# Patient Record
Sex: FEMALE | Race: WHITE | Employment: STUDENT | URBAN - METROPOLITAN AREA
[De-identification: names, ages, dates, MRNs, and addresses within clinical notes are randomized per-mention and may not be internally consistent; named-entity substitution may affect disease eponyms.]

---
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
## 2024-03-04 DIAGNOSIS — Z30.011 ENCOUNTER FOR INITIAL PRESCRIPTION OF CONTRACEPTIVE PILLS: ICD-10-CM

## 2024-03-04 NOTE — TELEPHONE ENCOUNTER
Refill Request     CONFIRM preferred pharmacy with the patient.    If Mail Order Rx - Pend for 90 day refill.      Last Seen: Last Seen Department: 12/15/2023  Last Seen by PCP: 12/15/2023    Last Written: 9/20/2023, #28, 5 refills    If no future appointment scheduled:  Review the last OV with PCP and review information for follow-up visit,  Route STAFF MESSAGE with patient name to the  Pool for scheduling with the following information:            -  Timing of next visit           -  Visit type ie Physical, OV, etc           -  Diagnoses/Reason ie. COPD, HTN - Do not use MEDICATION, Follow-up or CHECK UP - Give reason for visit      Next Appointment:   Future Appointments   Date Time Provider Department Center   5/10/2024 10:30 AM SCHEDULE, [facility]       Message sent to  to schedule appt with patient?  NO      Requested Prescriptions     Pending Prescriptions Disp Refills    TRI-SPRINTEC 0.18/0.215/0.25 MG-35 MCG TABS [Pharmacy Med Name: TRI-SPRINTEC TABLET] 28 tablet 5     Sig: TAKE 1 TABLET BY MOUTH DAILY

## 2024-03-05 RX ORDER — NORGESTIMATE AND ETHINYL ESTRADIOL 7DAYSX3 28
1 KIT ORAL DAILY
Qty: 28 TABLET | Refills: 5 | Status: SHIPPED | OUTPATIENT
Start: 2024-03-05

## 2024-03-13 ENCOUNTER — OFFICE VISIT (OUTPATIENT)
Dept: FAMILY MEDICINE CLINIC | Age: 19
End: 2024-03-13
Payer: OTHER GOVERNMENT

## 2024-03-13 VITALS
DIASTOLIC BLOOD PRESSURE: 70 MMHG | WEIGHT: 154 LBS | HEART RATE: 114 BPM | HEIGHT: 68 IN | BODY MASS INDEX: 23.34 KG/M2 | OXYGEN SATURATION: 98 % | SYSTOLIC BLOOD PRESSURE: 122 MMHG

## 2024-03-13 DIAGNOSIS — R30.0 DYSURIA: Primary | ICD-10-CM

## 2024-03-13 DIAGNOSIS — R31.9 HEMATURIA, UNSPECIFIED TYPE: ICD-10-CM

## 2024-03-13 DIAGNOSIS — R82.998 LEUKOCYTES IN URINE: ICD-10-CM

## 2024-03-13 DIAGNOSIS — R39.89 SUSPECTED UTI: ICD-10-CM

## 2024-03-13 DIAGNOSIS — Z20.2 POSSIBLE EXPOSURE TO STD: ICD-10-CM

## 2024-03-13 LAB
BILIRUBIN, POC: NEGATIVE
BLOOD URINE, POC: NORMAL
CLARITY, POC: NORMAL
COLOR, POC: YELLOW
GLUCOSE URINE, POC: NEGATIVE
KETONES, POC: NEGATIVE
LEUKOCYTE EST, POC: NORMAL
NITRITE, POC: NEGATIVE
PH, POC: 7
PROTEIN, POC: NORMAL
SPECIFIC GRAVITY, POC: 1.02
UROBILINOGEN, POC: NORMAL

## 2024-03-13 PROCEDURE — 81002 URINALYSIS NONAUTO W/O SCOPE: CPT | Performed by: NURSE PRACTITIONER

## 2024-03-13 PROCEDURE — 99214 OFFICE O/P EST MOD 30 MIN: CPT | Performed by: NURSE PRACTITIONER

## 2024-03-13 RX ORDER — NITROFURANTOIN 25; 75 MG/1; MG/1
100 CAPSULE ORAL 2 TIMES DAILY
Qty: 14 CAPSULE | Refills: 0 | Status: SHIPPED | OUTPATIENT
Start: 2024-03-13 | End: 2024-03-20

## 2024-03-13 ASSESSMENT — ENCOUNTER SYMPTOMS
CONSTIPATION: 1
ABDOMINAL PAIN: 1
NAUSEA: 0
VOMITING: 0
DIARRHEA: 0
BLOOD IN STOOL: 0
SHORTNESS OF BREATH: 0

## 2024-03-13 NOTE — PROGRESS NOTES
Cayla A Enzweiler (:  2005) is a 18 y.o. female,Established patient, here for evaluation of the following chief complaint(s):  Abdominal Pain (Sxs 3/08/2024)         ASSESSMENT/PLAN:  1. Dysuria  -     POCT Urinalysis no Micro  -     Culture, Urine  -     nitrofurantoin, macrocrystal-monohydrate, (MACROBID) 100 MG capsule; Take 1 capsule by mouth 2 times daily for 7 days, Disp-14 capsule, R-0Normal  -     VAGINAL PATHOGENS PROBE *A  -     C.trachomatis N.gonorrhoeae DNA, Urine ([facility] Only); Future  2. Leukocytes in urine  -     POCT Urinalysis no Micro  -     Culture, Urine  -     nitrofurantoin, macrocrystal-monohydrate, (MACROBID) 100 MG capsule; Take 1 capsule by mouth 2 times daily for 7 days, Disp-14 capsule, R-0Normal  -     C.trachomatis N.gonorrhoeae DNA, Urine ([facility] Only); Future  3. Hematuria, unspecified type  -     POCT Urinalysis no Micro  -     Culture, Urine  -     nitrofurantoin, macrocrystal-monohydrate, (MACROBID) 100 MG capsule; Take 1 capsule by mouth 2 times daily for 7 days, Disp-14 capsule, R-0Normal  -     C.trachomatis N.gonorrhoeae DNA, Urine ([facility] Only); Future  4. Possible exposure to STD  -     VAGINAL PATHOGENS PROBE *A  -     C.trachomatis N.gonorrhoeae DNA, Urine ([facility] Only); Future  5. Suspected UTI  -     nitrofurantoin, macrocrystal-monohydrate, (MACROBID) 100 MG capsule; Take 1 capsule by mouth 2 times daily for 7 days, Disp-14 capsule, R-0Normal  -     C.trachomatis N.gonorrhoeae DNA, Urine ([facility] Only); Future    -Urine dip with blood and leukocytes, will send on for culture   -STD counseling provided. Declines HIV, Hep or syphilis at this time but does want to proceed with others.   -If symptoms persist patient will need pelvic exam (declined today), she is agreeable to this if needed . Currently switching GYN providers as previous one was at [facility]. She will establishing with one at [facility]   -Reviewed allergies, discussed

## 2024-03-13 NOTE — PATIENT INSTRUCTIONS
Drink plenty of water   Wipe front to back after urinating  Urinate after sexual intercourse   Avoid bath bombs, bubble baths etc    Change pads/tampons frequently   6. Go to Er or notify office if develop high fever with back pain, significant worsening of symptoms, chest pain or shortness of breath   7. Take antibiotic as prescribed  8. Will call with results of urine culture and base further treatement on results  9. Follow up if symptoms worsen or fail to improve    10. Antibiotics can effect birth control pills, use backup method of protection

## 2024-03-14 LAB
BACTERIA UR CULT: NORMAL
CANDIDA DNA VAG QL NAA+PROBE: NORMAL
G VAGINALIS DNA SPEC QL NAA+PROBE: NORMAL
T VAGINALIS DNA VAG QL NAA+PROBE: NORMAL

## 2024-03-15 LAB
C TRACH DNA UR QL NAA+PROBE: NEGATIVE
N GONORRHOEA DNA UR QL NAA+PROBE: NEGATIVE

## 2024-06-12 ENCOUNTER — OFFICE VISIT (OUTPATIENT)
Dept: FAMILY MEDICINE CLINIC | Age: 19
End: 2024-06-12
Payer: OTHER GOVERNMENT

## 2024-06-12 VITALS
HEART RATE: 81 BPM | HEIGHT: 68 IN | OXYGEN SATURATION: 97 % | WEIGHT: 144 LBS | SYSTOLIC BLOOD PRESSURE: 122 MMHG | DIASTOLIC BLOOD PRESSURE: 60 MMHG | BODY MASS INDEX: 21.82 KG/M2

## 2024-06-12 DIAGNOSIS — N61.0: ICD-10-CM

## 2024-06-12 DIAGNOSIS — N64.4 BREAST PAIN: Primary | ICD-10-CM

## 2024-06-12 DIAGNOSIS — N64.59 NIPPLE PROBLEM: ICD-10-CM

## 2024-06-12 PROCEDURE — 99213 OFFICE O/P EST LOW 20 MIN: CPT | Performed by: NURSE PRACTITIONER

## 2024-06-12 RX ORDER — CLOBETASOL PROPIONATE 0.5 MG/G
OINTMENT TOPICAL
COMMUNITY
Start: 2024-04-22

## 2024-06-12 RX ORDER — AMOXICILLIN AND CLAVULANATE POTASSIUM 875; 125 MG/1; MG/1
1 TABLET, FILM COATED ORAL 2 TIMES DAILY
Qty: 14 TABLET | Refills: 0 | Status: SHIPPED | OUTPATIENT
Start: 2024-06-12 | End: 2024-06-19

## 2024-06-12 ASSESSMENT — ENCOUNTER SYMPTOMS
COUGH: 0
DIARRHEA: 0
STRIDOR: 0
NAUSEA: 0
VOMITING: 0

## 2024-06-12 NOTE — PROGRESS NOTES
Cayla A Enzweiler (:  2005) is a 19 y.o. female,Established patient, here for evaluation of the following chief complaint(s):  Breast Problem (Sxs x6 days. Left side. )      Assessment & Plan   ASSESSMENT/PLAN:  1. Breast pain  -     US BREAST LIMITED LEFT; Future  -     amoxicillin-clavulanate (AUGMENTIN) 875-125 MG per tablet; Take 1 tablet by mouth 2 times daily for 7 days, Disp-14 tablet, R-0Normal  2. Nipple problem  -     US BREAST LIMITED LEFT; Future  -     amoxicillin-clavulanate (AUGMENTIN) 875-125 MG per tablet; Take 1 tablet by mouth 2 times daily for 7 days, Disp-14 tablet, R-0Normal  3. Nonpurulent mastitis    -US pending for further evaluation  -With tenderness to left breast and nipple area and slight swelling noted and size difference in nipples will start Augmentin to treat potential mastitis. Discussed with patient that if symptoms persist and do not improve will send to breast team for further evaluation. She is in agreement with this plan   -Reviewed allergies, discussed medication risks, benefits, side effects. Take medication with food. Advised to use back up method of birth control if taking any   -Reviewed symptom management   -Reviewed alarm symptoms    Patient Instructions   Take antibiotic with food   Cold compresses   Get ultrasound  Notify office if symptoms persist or do not improve or worsen      Return if symptoms worsen or fail to improve.         Subjective   SUBJECTIVE/OBJECTIVE:  Reports symptoms for six days   Left nipple with sharp, stabbing pain, larger in size with redness  Previous cyst on right breast five years ago, was removed   Denies any discharge, fever, N/V, diarrhea, itching   No family history of breast cancer           Breast Problem      Review of Systems   Constitutional:  Negative for chills and fever.   Respiratory:  Negative for cough and stridor.    Gastrointestinal:  Negative for diarrhea, nausea and vomiting.   Genitourinary:         Left breast  Statement Selected

## 2024-06-12 NOTE — PATIENT INSTRUCTIONS
Take antibiotic with food   Cold compresses   Get ultrasound  Notify office if symptoms persist or do not improve or worsen

## 2024-06-20 ENCOUNTER — TELEPHONE (OUTPATIENT)
Dept: FAMILY MEDICINE CLINIC | Age: 19
End: 2024-06-20

## 2024-06-20 NOTE — TELEPHONE ENCOUNTER
Patient called the office stating that she is still having left breast and nipple pain only if she pushes on her breast. She continues to have constant discharge, this is increasing daily. The pus is green/yellow, not sure about an odor. The pus is only on the nipple area. Breast is still red and warm to the touch. Patient has finished her antibiotic as of today.     Patient couldn't get in for her breast US until July 3rd.     Please advise, thanks.

## 2024-06-20 NOTE — TELEPHONE ENCOUNTER
Please have patient come in for a visit and wound culture. Would like to re-evaluate breast tissue and likely start alternative treatment. OK to schedule with me or female provider

## 2024-06-21 ENCOUNTER — OFFICE VISIT (OUTPATIENT)
Dept: FAMILY MEDICINE CLINIC | Age: 19
End: 2024-06-21
Payer: OTHER GOVERNMENT

## 2024-06-21 VITALS
DIASTOLIC BLOOD PRESSURE: 60 MMHG | WEIGHT: 142.4 LBS | OXYGEN SATURATION: 100 % | SYSTOLIC BLOOD PRESSURE: 120 MMHG | HEART RATE: 76 BPM | BODY MASS INDEX: 21.65 KG/M2

## 2024-06-21 DIAGNOSIS — N61.0: Primary | ICD-10-CM

## 2024-06-21 PROCEDURE — 99212 OFFICE O/P EST SF 10 MIN: CPT | Performed by: STUDENT IN AN ORGANIZED HEALTH CARE EDUCATION/TRAINING PROGRAM

## 2024-06-21 ASSESSMENT — PATIENT HEALTH QUESTIONNAIRE - PHQ9
SUM OF ALL RESPONSES TO PHQ QUESTIONS 1-9: 0
1. LITTLE INTEREST OR PLEASURE IN DOING THINGS: NOT AT ALL
SUM OF ALL RESPONSES TO PHQ QUESTIONS 1-9: 0
2. FEELING DOWN, DEPRESSED OR HOPELESS: NOT AT ALL
SUM OF ALL RESPONSES TO PHQ9 QUESTIONS 1 & 2: 0

## 2024-06-21 NOTE — PROGRESS NOTES
Assessment:  Encounter Diagnosis   Name Primary?    Nonpurulent mastitis Yes       Plan:  1. Nonpurulent mastitis  Exam today reassuring with no expressible discharge. No tenderness on exam. If recurrent then return for evaluation but discussed likely pus was from abscess appropriate treatment on abx.       No follow-ups on file.      Patient: Cayla A Enzweiler is a 19 y.o. female who presents today with the following Chief Complaint(s):  Chief Complaint   Patient presents with    Breast Pain      Had an antibiotic got it (6/12) , - finished it (6/20) L breast, tender, puss coming out of nipple 2 days before finishing the antibiotic   breast re-evaluation           HPI    Reports abscess behind areola on left breast 6.12  Started on augmentin and reports that pain and tenderness improved but then noticed pus drainage from nipple area over last 2 days. Today drainage area has scabbed with no discharge and no pain    Does have history of right breast cyst in 8th grade that was removed.   Current Outpatient Medications   Medication Sig Dispense Refill    clobetasol (TEMOVATE) 0.05 % ointment       TRI-SPRINTEC 0.18/0.215/0.25 MG-35 MCG TABS TAKE 1 TABLET BY MOUTH DAILY 28 tablet 5     No current facility-administered medications for this visit.       Patient's past medical history, surgical history, family history, medications,  andallergies  were all reviewed and updated as appropriate today.      Review of Systems  All other systems reviewed and negative    Physical Exam  Exam conducted with a chaperone present (John VILLANUEVA).   Chest:   Breasts:     Left: No inverted nipple, mass, nipple discharge, skin change or tenderness.       Vitals:    06/21/24 1036   BP: 120/60   Pulse: 76   SpO2: 100%       Please note that portions of this note may have been completed with a voice recognition program. Efforts were made to edit the dictations but occasionally words are mis-transcribed.

## 2024-06-21 NOTE — TELEPHONE ENCOUNTER
Spoke with patient, she stated that it has gotten better since the phone call. She stated that personally for what it was will come back in for re evaluation

## 2024-07-11 ENCOUNTER — HOSPITAL ENCOUNTER (OUTPATIENT)
Dept: GENERAL RADIOLOGY | Age: 19
Discharge: HOME | End: 2024-07-11
Payer: OTHER GOVERNMENT

## 2024-07-11 ENCOUNTER — OFFICE VISIT (OUTPATIENT)
Dept: FAMILY MEDICINE CLINIC | Age: 19
End: 2024-07-11
Payer: OTHER GOVERNMENT

## 2024-07-11 VITALS
SYSTOLIC BLOOD PRESSURE: 114 MMHG | DIASTOLIC BLOOD PRESSURE: 70 MMHG | HEART RATE: 62 BPM | WEIGHT: 140.8 LBS | HEIGHT: 68 IN | OXYGEN SATURATION: 98 % | BODY MASS INDEX: 21.34 KG/M2

## 2024-07-11 DIAGNOSIS — K59.00 CONSTIPATION, UNSPECIFIED CONSTIPATION TYPE: Primary | ICD-10-CM

## 2024-07-11 DIAGNOSIS — K59.04 CHRONIC IDIOPATHIC CONSTIPATION: Primary | ICD-10-CM

## 2024-07-11 DIAGNOSIS — R10.9 ABDOMINAL PAIN, UNSPECIFIED ABDOMINAL LOCATION: ICD-10-CM

## 2024-07-11 DIAGNOSIS — K59.00 CONSTIPATION, UNSPECIFIED CONSTIPATION TYPE: ICD-10-CM

## 2024-07-11 DIAGNOSIS — R11.0 NAUSEA: ICD-10-CM

## 2024-07-11 DIAGNOSIS — R10.84 GENERALIZED ABDOMINAL PAIN: ICD-10-CM

## 2024-07-11 PROCEDURE — 99213 OFFICE O/P EST LOW 20 MIN: CPT | Performed by: STUDENT IN AN ORGANIZED HEALTH CARE EDUCATION/TRAINING PROGRAM

## 2024-07-11 PROCEDURE — 74018 RADEX ABDOMEN 1 VIEW: CPT

## 2024-07-11 NOTE — PROGRESS NOTES
Assessment:  Encounter Diagnoses   Name Primary?    Chronic idiopathic constipation Yes    Generalized abdominal pain        Plan:  1. Chronic idiopathic constipation  2. Generalized abdominal pain  KUB obtained prior to visit and interpreted by me with large amount of stool present and no obvious bowel obstruction. Generalized mild/moderate abdominal tenderness on exam but most significant in RLQ and LLQ. Reports passing gas and low suspicion for impaction. Discussed aggressive cleanout vs increasing golax to 1 cap BID until passage of stool. Will go with less aggressive treatment to start. Will then start daily fiber supplement and try to avoid use of stimulant laxatives routinely. If bowel frequency and pain does not improve with use of metamucil/citrucil supplementation then consider GI evaluation.       No follow-ups on file.      Patient: Cayla A Enzweiler is a 19 y.o. female who presents today with the following Chief Complaint(s):  Chief Complaint   Patient presents with    Abdominal Pain         HPI    Has a history of chronic constipation  Has required bowel cleanout intermittently  Reports she has not previously seen GI  No Familial history of GI abnormality    Reports that she will frequently go 2-5 days without BM but has noticed increasing abdominal pain and radiation around to her back over the last few days since her previous BM. Will typically use golax (doccusate) capsules with improvement. Does not add fiber to her diet but does drink 64 oz of fluid+ daily.     Current Outpatient Medications   Medication Sig Dispense Refill    clobetasol (TEMOVATE) 0.05 % ointment       TRI-SPRINTEC 0.18/0.215/0.25 MG-35 MCG TABS TAKE 1 TABLET BY MOUTH DAILY 28 tablet 5     No current facility-administered medications for this visit.       Patient's past medical history, surgical history, family history, medications,  andallergies  were all reviewed and updated as appropriate today.      Review of Systems  All

## 2024-08-14 ENCOUNTER — OFFICE VISIT (OUTPATIENT)
Dept: FAMILY MEDICINE CLINIC | Age: 19
End: 2024-08-14
Payer: OTHER GOVERNMENT

## 2024-08-14 VITALS
DIASTOLIC BLOOD PRESSURE: 50 MMHG | TEMPERATURE: 97 F | WEIGHT: 144 LBS | HEART RATE: 67 BPM | SYSTOLIC BLOOD PRESSURE: 90 MMHG | HEIGHT: 68 IN | BODY MASS INDEX: 21.82 KG/M2 | OXYGEN SATURATION: 98 %

## 2024-08-14 DIAGNOSIS — R00.0 TACHYCARDIA: ICD-10-CM

## 2024-08-14 DIAGNOSIS — R42 DIZZINESS: Primary | ICD-10-CM

## 2024-08-14 PROCEDURE — 99213 OFFICE O/P EST LOW 20 MIN: CPT | Performed by: STUDENT IN AN ORGANIZED HEALTH CARE EDUCATION/TRAINING PROGRAM

## 2024-08-14 SDOH — ECONOMIC STABILITY: FOOD INSECURITY: WITHIN THE PAST 12 MONTHS, THE FOOD YOU BOUGHT JUST DIDN'T LAST AND YOU DIDN'T HAVE MONEY TO GET MORE.: NEVER TRUE

## 2024-08-14 SDOH — ECONOMIC STABILITY: INCOME INSECURITY: HOW HARD IS IT FOR YOU TO PAY FOR THE VERY BASICS LIKE FOOD, HOUSING, MEDICAL CARE, AND HEATING?: NOT HARD AT ALL

## 2024-08-14 SDOH — ECONOMIC STABILITY: FOOD INSECURITY: WITHIN THE PAST 12 MONTHS, YOU WORRIED THAT YOUR FOOD WOULD RUN OUT BEFORE YOU GOT MONEY TO BUY MORE.: NEVER TRUE

## 2024-08-14 NOTE — PROGRESS NOTES
Assessment:  Encounter Diagnoses   Name Primary?    Dizziness Yes    Tachycardia        Plan:  1. Dizziness  -     Tilt table; Future  2. Tachycardia  -     Tilt table; Future  Question possible POTS with this being the second episode of tachycardia and lightheadedness.  Will have patient add an electrolyte beverage in the morning such as Gatorade in addition to her current fluid intake.  Also encouraged to continue exercise.  Will check tilt table test.  Reviewed lab work from [facility] including EKG which were all reassuring.  Consider cardiac evaluation and Holter monitor if recurrent    No follow-ups on file.      Patient: Cayla A Enzweiler is a 19 y.o. 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  She then sat down and started to feel a little better  These symptoms lasted about 30 minutes  Reports that during this episode her HR max was 151.     Reports that (8/10) after she felt a sharp midsternal chest pain that then spread into the rest of chest and into stomach. This lasted about 45 minutes. Was then taken to [facility] and blood work normal.      This is the second time this episode has opened    Trying to drink about 40 oz of feliciano cup, + 20 oz at work. Then also having a juice in the morning as well as gentlelax.     Typically wears apple watch daily. Has not noticed any other tachycardia episodes.   Stays active played basketball, soccer, running on her own without difficulties.     Current Outpatient Medications   Medication Sig Dispense Refill    clobetasol (TEMOVATE) 0.05 % ointment       TRI-SPRINTEC 0.18/0.215/0.25 MG-35 MCG TABS TAKE 1 TABLET BY MOUTH DAILY 28

## 2024-08-19 DIAGNOSIS — Z30.011 ENCOUNTER FOR INITIAL PRESCRIPTION OF CONTRACEPTIVE PILLS: ICD-10-CM

## 2024-08-19 RX ORDER — NORGESTIMATE AND ETHINYL ESTRADIOL 7DAYSX3 28
1 KIT ORAL DAILY
Qty: 84 TABLET | Refills: 3 | Status: SHIPPED | OUTPATIENT
Start: 2024-08-19

## 2024-08-19 NOTE — TELEPHONE ENCOUNTER
Refill Request     CONFIRM preferred pharmacy with the patient.    If Mail Order Rx - Pend for 90 day refill.      Last Seen: Last Seen Department: 8/14/2024  Last Seen by PCP: 8/14/2024    Last Written: 3/5/24 #28 - 5 refills     If no future appointment scheduled:  Review the last OV with PCP and review information for follow-up visit,  Route STAFF MESSAGE with patient name to the  Pool for scheduling with the following information:            -  Timing of next visit           -  Visit type ie Physical, OV, etc           -  Diagnoses/Reason ie. COPD, HTN - Do not use MEDICATION, Follow-up or CHECK UP - Give reason for visit      Next Appointment:   No future appointments.    Message sent to  to schedule appt with patient?  NO      Requested Prescriptions     Pending Prescriptions Disp Refills    Norgestim-Eth Estrad Triphasic (TRI-SPRINTEC) 0.18/0.215/0.25 MG-35 MCG TABS 28 tablet 5     Sig: Take 1 tablet by mouth daily

## 2024-09-24 ENCOUNTER — APPOINTMENT (OUTPATIENT)
Dept: GENERAL RADIOLOGY | Age: 19
End: 2024-09-24
Payer: OTHER GOVERNMENT

## 2024-09-24 ENCOUNTER — OFFICE VISIT (OUTPATIENT)
Dept: FAMILY MEDICINE CLINIC | Age: 19
End: 2024-09-24
Payer: OTHER GOVERNMENT

## 2024-09-24 ENCOUNTER — HOSPITAL ENCOUNTER (EMERGENCY)
Age: 19
Discharge: HOME | End: 2024-09-24
Payer: OTHER GOVERNMENT

## 2024-09-24 VITALS
TEMPERATURE: 98.3 F | SYSTOLIC BLOOD PRESSURE: 112 MMHG | OXYGEN SATURATION: 99 % | HEART RATE: 82 BPM | WEIGHT: 132 LBS | RESPIRATION RATE: 19 BRPM | DIASTOLIC BLOOD PRESSURE: 72 MMHG | HEIGHT: 68 IN | BODY MASS INDEX: 20 KG/M2

## 2024-09-24 VITALS
DIASTOLIC BLOOD PRESSURE: 70 MMHG | OXYGEN SATURATION: 98 % | BODY MASS INDEX: 20.16 KG/M2 | WEIGHT: 133 LBS | HEIGHT: 68 IN | HEART RATE: 86 BPM | SYSTOLIC BLOOD PRESSURE: 112 MMHG

## 2024-09-24 DIAGNOSIS — R05.1 ACUTE COUGH: Primary | ICD-10-CM

## 2024-09-24 DIAGNOSIS — J06.9 VIRAL URI: ICD-10-CM

## 2024-09-24 DIAGNOSIS — T78.40XA ALLERGIC REACTION, INITIAL ENCOUNTER: ICD-10-CM

## 2024-09-24 PROCEDURE — 96375 TX/PRO/DX INJ NEW DRUG ADDON: CPT

## 2024-09-24 PROCEDURE — 99213 OFFICE O/P EST LOW 20 MIN: CPT | Performed by: STUDENT IN AN ORGANIZED HEALTH CARE EDUCATION/TRAINING PROGRAM

## 2024-09-24 PROCEDURE — 2580000003 HC RX 258: Performed by: REGISTERED NURSE

## 2024-09-24 PROCEDURE — 2500000003 HC RX 250 WO HCPCS: Performed by: REGISTERED NURSE

## 2024-09-24 PROCEDURE — 99284 EMERGENCY DEPT VISIT MOD MDM: CPT

## 2024-09-24 PROCEDURE — 71046 X-RAY EXAM CHEST 2 VIEWS: CPT

## 2024-09-24 PROCEDURE — 96374 THER/PROPH/DIAG INJ IV PUSH: CPT

## 2024-09-24 PROCEDURE — 6360000002 HC RX W HCPCS: Performed by: REGISTERED NURSE

## 2024-09-24 RX ORDER — AZITHROMYCIN 250 MG/1
TABLET, FILM COATED ORAL
Qty: 6 TABLET | Refills: 0 | Status: SHIPPED | OUTPATIENT
Start: 2024-09-24 | End: 2024-09-24

## 2024-09-24 RX ORDER — AZITHROMYCIN 250 MG/1
TABLET, FILM COATED ORAL
Qty: 6 TABLET | Refills: 0 | Status: SHIPPED | OUTPATIENT
Start: 2024-09-24 | End: 2024-10-04

## 2024-09-24 RX ORDER — ONDANSETRON 2 MG/ML
4 INJECTION INTRAMUSCULAR; INTRAVENOUS ONCE
Status: COMPLETED | OUTPATIENT
Start: 2024-09-24 | End: 2024-09-24

## 2024-09-24 RX ORDER — BENZONATATE 200 MG/1
200 CAPSULE ORAL 3 TIMES DAILY PRN
Qty: 30 CAPSULE | Refills: 0 | Status: SHIPPED | OUTPATIENT
Start: 2024-09-24 | End: 2024-10-01

## 2024-09-24 RX ADMIN — ONDANSETRON 4 MG: 2 INJECTION INTRAMUSCULAR; INTRAVENOUS at 18:21

## 2024-09-24 RX ADMIN — FAMOTIDINE 20 MG: 10 INJECTION, SOLUTION INTRAVENOUS at 18:21

## 2024-09-24 ASSESSMENT — PAIN SCALES - GENERAL: PAINLEVEL_OUTOF10: 6

## 2024-09-24 ASSESSMENT — PAIN DESCRIPTION - DESCRIPTORS: DESCRIPTORS: ACHING

## 2024-09-24 ASSESSMENT — PAIN DESCRIPTION - LOCATION: LOCATION: BACK

## 2024-09-24 ASSESSMENT — PAIN DESCRIPTION - ORIENTATION: ORIENTATION: UPPER

## 2024-09-24 ASSESSMENT — PAIN - FUNCTIONAL ASSESSMENT: PAIN_FUNCTIONAL_ASSESSMENT: 0-10

## 2024-09-25 ENCOUNTER — TELEPHONE (OUTPATIENT)
Dept: FAMILY MEDICINE CLINIC | Age: 19
End: 2024-09-25

## 2024-09-25 ASSESSMENT — ENCOUNTER SYMPTOMS
CHEST TIGHTNESS: 0
SHORTNESS OF BREATH: 1
WHEEZING: 0
ABDOMINAL PAIN: 0
STRIDOR: 0
COUGH: 1

## 2025-03-06 ENCOUNTER — TELEPHONE (OUTPATIENT)
Dept: FAMILY MEDICINE CLINIC | Age: 20
End: 2025-03-06

## 2025-03-06 NOTE — TELEPHONE ENCOUNTER
Patient called into the office requesting an appointment due to jaw pain.  Patient states that her jaw pain has been going on for a few weeks. Patient scheduled for 03/10/25.  Patient advised to arrive 15 minutes early for vitals and check in.  Patient verbalized understanding.

## 2025-03-10 ENCOUNTER — OFFICE VISIT (OUTPATIENT)
Dept: FAMILY MEDICINE CLINIC | Age: 20
End: 2025-03-10
Payer: OTHER GOVERNMENT

## 2025-03-10 VITALS
HEIGHT: 68 IN | BODY MASS INDEX: 21.22 KG/M2 | DIASTOLIC BLOOD PRESSURE: 70 MMHG | HEART RATE: 78 BPM | WEIGHT: 140 LBS | OXYGEN SATURATION: 98 % | SYSTOLIC BLOOD PRESSURE: 116 MMHG

## 2025-03-10 DIAGNOSIS — M26.609 TMJ DYSFUNCTION: Primary | ICD-10-CM

## 2025-03-10 PROCEDURE — 99213 OFFICE O/P EST LOW 20 MIN: CPT | Performed by: STUDENT IN AN ORGANIZED HEALTH CARE EDUCATION/TRAINING PROGRAM

## 2025-03-10 SDOH — ECONOMIC STABILITY: FOOD INSECURITY: WITHIN THE PAST 12 MONTHS, THE FOOD YOU BOUGHT JUST DIDN'T LAST AND YOU DIDN'T HAVE MONEY TO GET MORE.: NEVER TRUE

## 2025-03-10 SDOH — ECONOMIC STABILITY: FOOD INSECURITY: WITHIN THE PAST 12 MONTHS, YOU WORRIED THAT YOUR FOOD WOULD RUN OUT BEFORE YOU GOT MONEY TO BUY MORE.: NEVER TRUE

## 2025-03-10 ASSESSMENT — PATIENT HEALTH QUESTIONNAIRE - PHQ9
SUM OF ALL RESPONSES TO PHQ QUESTIONS 1-9: 0
1. LITTLE INTEREST OR PLEASURE IN DOING THINGS: NOT AT ALL
2. FEELING DOWN, DEPRESSED OR HOPELESS: NOT AT ALL
SUM OF ALL RESPONSES TO PHQ QUESTIONS 1-9: 0

## 2025-03-10 NOTE — PROGRESS NOTES
Assessment:  Encounter Diagnosis   Name Primary?    TMJ dysfunction Yes       Plan:  1. TMJ dysfunction  Exam with masseter tenderness, jaw deviation consistent with muscular spasm.  Given home exercises and will follow-up if not improving      No follow-ups on file.      Patient: Cayla A Enzweiler is a 19 y.o. female who presents today with the following Chief Complaint(s):  Chief Complaint   Patient presents with    Jaw Pain         HPI    Reports that during recent episode o emesis she felt pop on right side  Will intermittently improve and have no issues then will wake up at other times with significant pain and difficulty opening mouth completely.   Will occur 1-2 times aweek and last for a day or two.       Current Outpatient Medications   Medication Sig Dispense Refill    Norgestim-Eth Estrad Triphasic (TRI-SPRINTEC) 0.18/0.215/0.25 MG-35 MCG TABS Take 1 tablet by mouth daily 84 tablet 3    clobetasol (TEMOVATE) 0.05 % ointment        No current facility-administered medications for this visit.       Patient's past medical history, surgical history, family history, medications,  andallergies  were all reviewed and updated as appropriate today.      Review of Systems  All other systems reviewed and negative    Physical Exam  HENT:      Head:        Comments: Tenderness inferior to TMJ joint along body of right masseter      Pain with jaw deviation away from affected side  Jaw deviation to the right with attempted max opening  Vitals:    03/10/25 1353   BP: 116/70   Pulse: 78   SpO2: 98%       Please note that portions of this note may have been completed with a voice recognition program. Efforts were made to edit the dictations but occasionally words are mis-transcribed.

## 2025-05-09 ENCOUNTER — APPOINTMENT (OUTPATIENT)
Dept: CT IMAGING | Age: 20
End: 2025-05-09
Payer: OTHER GOVERNMENT

## 2025-05-09 ENCOUNTER — HOSPITAL ENCOUNTER (EMERGENCY)
Age: 20
Discharge: HOME | End: 2025-05-09
Attending: STUDENT IN AN ORGANIZED HEALTH CARE EDUCATION/TRAINING PROGRAM
Payer: OTHER GOVERNMENT

## 2025-05-09 VITALS
OXYGEN SATURATION: 100 % | WEIGHT: 136.8 LBS | SYSTOLIC BLOOD PRESSURE: 114 MMHG | RESPIRATION RATE: 16 BRPM | BODY MASS INDEX: 20.73 KG/M2 | HEIGHT: 68 IN | DIASTOLIC BLOOD PRESSURE: 70 MMHG | HEART RATE: 57 BPM | TEMPERATURE: 97.6 F

## 2025-05-09 DIAGNOSIS — K59.00 CONSTIPATION, UNSPECIFIED CONSTIPATION TYPE: Primary | ICD-10-CM

## 2025-05-09 LAB
ALBUMIN SERPL-MCNC: 4.5 G/DL (ref 3.4–5)
ALBUMIN/GLOB SERPL: 1.7 {RATIO} (ref 1.1–2.2)
ALP SERPL-CCNC: 45 U/L (ref 40–129)
ALT SERPL-CCNC: 16 U/L (ref 10–40)
ANION GAP SERPL CALCULATED.3IONS-SCNC: 12 MMOL/L (ref 3–16)
AST SERPL-CCNC: 17 U/L (ref 15–37)
BASOPHILS # BLD: 0 K/UL (ref 0–0.2)
BASOPHILS NFR BLD: 0.7 %
BILIRUB SERPL-MCNC: 0.5 MG/DL (ref 0–1)
BILIRUB UR QL STRIP.AUTO: NEGATIVE
BUN SERPL-MCNC: 8 MG/DL (ref 7–20)
CALCIUM SERPL-MCNC: 10.4 MG/DL (ref 8.3–10.6)
CHLORIDE SERPL-SCNC: 103 MMOL/L (ref 99–110)
CLARITY UR: CLEAR
CO2 SERPL-SCNC: 25 MMOL/L (ref 21–32)
COLOR UR: YELLOW
CREAT SERPL-MCNC: 0.8 MG/DL (ref 0.6–1.1)
DEPRECATED RDW RBC AUTO: 12.4 % (ref 12.4–15.4)
EOSINOPHIL # BLD: 0.1 K/UL (ref 0–0.6)
EOSINOPHIL NFR BLD: 2.1 %
GFR SERPLBLD CREATININE-BSD FMLA CKD-EPI: >90 ML/MIN/{1.73_M2}
GLUCOSE SERPL-MCNC: 85 MG/DL (ref 70–99)
GLUCOSE UR STRIP.AUTO-MCNC: NEGATIVE MG/DL
HCG SERPL QL: NEGATIVE
HCT VFR BLD AUTO: 42.4 % (ref 36–48)
HGB BLD-MCNC: 14.1 G/DL (ref 12–16)
HGB UR QL STRIP.AUTO: NEGATIVE
KETONES UR STRIP.AUTO-MCNC: NEGATIVE MG/DL
LEUKOCYTE ESTERASE UR QL STRIP.AUTO: NEGATIVE
LIPASE SERPL-CCNC: 40 U/L (ref 13–60)
LYMPHOCYTES # BLD: 1.2 K/UL (ref 1–5.1)
LYMPHOCYTES NFR BLD: 40.6 %
MCH RBC QN AUTO: 29.4 PG (ref 26–34)
MCHC RBC AUTO-ENTMCNC: 33.2 G/DL (ref 31–36)
MCV RBC AUTO: 88.6 FL (ref 80–100)
MONOCYTES # BLD: 0.3 K/UL (ref 0–1.3)
MONOCYTES NFR BLD: 9.5 %
NEUTROPHILS # BLD: 1.4 K/UL (ref 1.7–7.7)
NEUTROPHILS NFR BLD: 47.1 %
NITRITE UR QL STRIP.AUTO: NEGATIVE
PH UR STRIP.AUTO: 8 [PH] (ref 5–8)
PLATELET # BLD AUTO: 264 K/UL (ref 135–450)
PMV BLD AUTO: 8.7 FL (ref 5–10.5)
POTASSIUM SERPL-SCNC: 4.1 MMOL/L (ref 3.5–5.1)
PROT SERPL-MCNC: 7.1 G/DL (ref 6.4–8.2)
PROT UR STRIP.AUTO-MCNC: NEGATIVE MG/DL
RBC # BLD AUTO: 4.78 M/UL (ref 4–5.2)
SODIUM SERPL-SCNC: 140 MMOL/L (ref 136–145)
SP GR UR STRIP.AUTO: 1.02 (ref 1–1.03)
UA COMPLETE W REFLEX CULTURE PNL UR: NORMAL
UA DIPSTICK W REFLEX MICRO PNL UR: NORMAL
URN SPEC COLLECT METH UR: NORMAL
UROBILINOGEN UR STRIP-ACNC: 0.2 E.U./DL
WBC # BLD AUTO: 2.9 K/UL (ref 4–11)

## 2025-05-09 PROCEDURE — 81003 URINALYSIS AUTO W/O SCOPE: CPT

## 2025-05-09 PROCEDURE — 80053 COMPREHEN METABOLIC PANEL: CPT

## 2025-05-09 PROCEDURE — 84703 CHORIONIC GONADOTROPIN ASSAY: CPT

## 2025-05-09 PROCEDURE — 85025 COMPLETE CBC W/AUTO DIFF WBC: CPT

## 2025-05-09 PROCEDURE — 99285 EMERGENCY DEPT VISIT HI MDM: CPT

## 2025-05-09 PROCEDURE — 74177 CT ABD & PELVIS W/CONTRAST: CPT

## 2025-05-09 PROCEDURE — 83690 ASSAY OF LIPASE: CPT

## 2025-05-09 PROCEDURE — 6360000004 HC RX CONTRAST MEDICATION

## 2025-05-09 RX ORDER — LACTULOSE 10 G/15ML
20 SOLUTION ORAL EVERY EVENING
Qty: 900 ML | Refills: 0 | Status: SHIPPED | OUTPATIENT
Start: 2025-05-09

## 2025-05-09 RX ORDER — IOPAMIDOL 755 MG/ML
75 INJECTION, SOLUTION INTRAVASCULAR
Status: COMPLETED | OUTPATIENT
Start: 2025-05-09 | End: 2025-05-09

## 2025-05-09 RX ADMIN — IOPAMIDOL 75 ML: 755 INJECTION, SOLUTION INTRAVENOUS at 13:16

## 2025-05-09 ASSESSMENT — PAIN SCALES - GENERAL: PAINLEVEL_OUTOF10: 5

## 2025-05-09 ASSESSMENT — PAIN DESCRIPTION - LOCATION: LOCATION: ABDOMEN

## 2025-05-09 ASSESSMENT — PAIN - FUNCTIONAL ASSESSMENT: PAIN_FUNCTIONAL_ASSESSMENT: 0-10

## 2025-05-09 NOTE — ED PROVIDER NOTES
[facility] EMERGENCY DEPARTMENT  EMERGENCY DEPARTMENT ENCOUNTER        Pt Name: Cayla A Enzweiler  MRN: [id number]  Birthdate 2005  Date of evaluation: 5/9/2025  Provider: SUSAN Villegas  PCP: Victorino Denis DO  Note Started: 12:18 PM EDT 5/9/25       I have seen and evaluated this patient with my supervising physician Dr. Deng       CHIEF COMPLAINT       Chief Complaint   Patient presents with    Constipation     No BM in 6 days. Pt c/o sharp pains and cramping and nausea. Pt has tried multiple OTC medications.        HISTORY OF PRESENT ILLNESS: 1 or more Elements     History From: Patient    Limitations to history : None    Social Determinants Significantly Affecting Health : None    Chief Complaint: Constipation    Cayla A Enzweiler is a 19 y.o. female who presents to the emergency department for constipation.  States that she has not had a good bowel movement in the last 6 days.  She does complain of a history of constipation however she states only when she takes MiraLAX or other laxatives that she is able to have a good bowel movement.  She states there has been small amounts of liquid stool.  Describes the pain as being sharp and cramping.  Admits associated nausea, no vomiting.  Denies fevers, chills, chest pain, shortness of breath, dysuria, urinary frequency or urgency.    Nursing Notes were all reviewed and agreed with or any disagreements were addressed in the HPI.    REVIEW OF SYSTEMS :      Review of Systems    Positives and Pertinent negatives as per HPI.     SURGICAL HISTORY     Past Surgical History:   Procedure Laterality Date    CYST REMOVAL Right        CURRENTMEDICATIONS       Discharge Medication List as of 5/9/2025  3:49 PM        CONTINUE these medications which have NOT CHANGED    Details   Norgestim-Eth Estrad Triphasic (TRI-SPRINTEC) 0.18/0.215/0.25 MG-35 MCG TABS Take 1 tablet by mouth daily, Disp-84 tablet, R-3Normal      clobetasol (TEMOVATE) 0.05 % ointment

## 2025-05-11 ENCOUNTER — HOSPITAL ENCOUNTER (EMERGENCY)
Age: 20
Discharge: HOME | End: 2025-05-11
Attending: EMERGENCY MEDICINE
Payer: OTHER GOVERNMENT

## 2025-05-11 VITALS
WEIGHT: 135.4 LBS | OXYGEN SATURATION: 100 % | HEART RATE: 69 BPM | BODY MASS INDEX: 20.52 KG/M2 | HEIGHT: 68 IN | SYSTOLIC BLOOD PRESSURE: 121 MMHG | TEMPERATURE: 98.1 F | DIASTOLIC BLOOD PRESSURE: 88 MMHG | RESPIRATION RATE: 14 BRPM

## 2025-05-11 DIAGNOSIS — K59.00 CONSTIPATION, UNSPECIFIED CONSTIPATION TYPE: Primary | ICD-10-CM

## 2025-05-11 PROCEDURE — 99283 EMERGENCY DEPT VISIT LOW MDM: CPT

## 2025-05-11 PROCEDURE — 6370000000 HC RX 637 (ALT 250 FOR IP): Performed by: EMERGENCY MEDICINE

## 2025-05-11 RX ORDER — SUCRALFATE 1 G/1
1 TABLET ORAL EVERY 6 HOURS
Qty: 80 TABLET | Refills: 3 | Status: SHIPPED | OUTPATIENT
Start: 2025-05-11

## 2025-05-11 RX ORDER — DICYCLOMINE HCL 20 MG
20 TABLET ORAL EVERY 6 HOURS
Qty: 80 TABLET | Refills: 0 | Status: SHIPPED | OUTPATIENT
Start: 2025-05-11

## 2025-05-11 RX ORDER — FAMOTIDINE 20 MG/1
20 TABLET, FILM COATED ORAL 2 TIMES DAILY PRN
Qty: 60 TABLET | Refills: 0 | Status: SHIPPED | OUTPATIENT
Start: 2025-05-11

## 2025-05-11 RX ORDER — DICYCLOMINE HCL 20 MG
20 TABLET ORAL ONCE
Status: COMPLETED | OUTPATIENT
Start: 2025-05-11 | End: 2025-05-11

## 2025-05-11 RX ORDER — FAMOTIDINE 20 MG/1
20 TABLET, FILM COATED ORAL ONCE
Status: COMPLETED | OUTPATIENT
Start: 2025-05-11 | End: 2025-05-11

## 2025-05-11 RX ORDER — SUCRALFATE 1 G/1
1 TABLET ORAL ONCE
Status: COMPLETED | OUTPATIENT
Start: 2025-05-11 | End: 2025-05-11

## 2025-05-11 RX ADMIN — FAMOTIDINE 20 MG: 20 TABLET, FILM COATED ORAL at 13:38

## 2025-05-11 RX ADMIN — SUCRALFATE 1 G: 1 TABLET ORAL at 13:37

## 2025-05-11 RX ADMIN — DICYCLOMINE HYDROCHLORIDE 20 MG: 20 TABLET ORAL at 13:37

## 2025-05-11 ASSESSMENT — LIFESTYLE VARIABLES
HOW MANY STANDARD DRINKS CONTAINING ALCOHOL DO YOU HAVE ON A TYPICAL DAY: PATIENT DOES NOT DRINK
HOW OFTEN DO YOU HAVE A DRINK CONTAINING ALCOHOL: NEVER

## 2025-05-11 ASSESSMENT — PAIN - FUNCTIONAL ASSESSMENT: PAIN_FUNCTIONAL_ASSESSMENT: 0-10

## 2025-05-11 ASSESSMENT — PAIN SCALES - GENERAL: PAINLEVEL_OUTOF10: 5

## 2025-05-11 ASSESSMENT — PAIN DESCRIPTION - LOCATION: LOCATION: ABDOMEN;BACK

## 2025-05-11 ASSESSMENT — PAIN DESCRIPTION - PAIN TYPE: TYPE: ACUTE PAIN

## 2025-05-11 ASSESSMENT — PAIN DESCRIPTION - DESCRIPTORS: DESCRIPTORS: CRAMPING;SHARP

## 2025-05-11 NOTE — ED PROVIDER NOTES
bowel movement.  Patient and father are agreeable to this plan until they are able to see gastroenterology.  Patient given a dose of Pepcid, Bentyl and Carafate in the ED and provided with prescriptions.  Patient is stable for discharge.    After discussion of results, diagnosis, and symptomatic care, I reiterated return precautions and importance of follow-up.   She Patient expressed understanding of all instruction. The patient was in agreement with plan, and all questions were answered. She was discharged home in stable condition.          Disposition Considerations (tests considered but not done, Shared Decision Making, Pt Expectation of Test or Tx.): Appropriate for outpatient management    I estimate there is low risk for sepsis, MI, stroke, tamponade, PTX, toxicity, or other life threatening etiology. Given the best available information and clinical assessment I estimate the risk of hospitalization to be greater than risk of treatment at home. We discussed and I explained the risk could rapidly change and return precautions and instructions given. Discharge disposition is reasonable.     I am the Primary Clinician of Record.  FINAL IMPRESSION      1. Constipation, unspecified constipation type          DISPOSITION/PLAN   DISPOSITION Decision To Discharge 05/11/2025 01:42:18 PM               PATIENT REFERRED TO:  Victorino Denis DO  [address] [place] [address]  [phone]    Schedule an appointment as soon as possible for a visit       [facility] Emergency Department  [address] [place] [address]  [phone]  Go to   As needed, If symptoms worsen      DISCHARGE MEDICATIONS:  Discharge Medication List as of 5/11/2025  1:39 PM        START taking these medications    Details   dicyclomine (BENTYL) 20 MG tablet Take 1 tablet by mouth every 6 hours, Disp-80 tablet, R-0Normal      famotidine (PEPCID) 20 MG tablet Take 1 tablet by mouth 2 times daily as needed (Acid Reflux), Disp-60

## 2025-05-12 ENCOUNTER — TELEPHONE (OUTPATIENT)
Dept: FAMILY MEDICINE CLINIC | Age: 20
End: 2025-05-12

## 2025-05-12 ASSESSMENT — ENCOUNTER SYMPTOMS
COUGH: 0
VOMITING: 0
BACK PAIN: 0
ABDOMINAL PAIN: 1
NAUSEA: 0
CONSTIPATION: 1
EYE REDNESS: 0
SHORTNESS OF BREATH: 0
RHINORRHEA: 0
DIARRHEA: 1
EYE PAIN: 0

## 2025-05-12 NOTE — TELEPHONE ENCOUNTER
ED Follow Up Call/ Schedule appt   ED: [facility]   Reason: Constipation  Date: 5/11/25    Appt scheduled:       Comments:     Left voicemail for patient to call the office back to schedule a ED follow up appointment.

## 2025-05-13 ENCOUNTER — TELEPHONE (OUTPATIENT)
Dept: FAMILY MEDICINE CLINIC | Age: 20
End: 2025-05-13

## 2025-05-13 NOTE — TELEPHONE ENCOUNTER
ED Follow Up Call/ Schedule appt   ED: [facility]   Reason: Constipation  Date: 5/11/25     Appt scheduled:         Comments:      Left voicemail for patient to call the office back to schedule a ED follow up appointment.    No future appointments.

## 2025-05-14 ENCOUNTER — TELEPHONE (OUTPATIENT)
Dept: FAMILY MEDICINE CLINIC | Age: 20
End: 2025-05-14

## 2025-05-14 NOTE — TELEPHONE ENCOUNTER
ED Follow Up Call/ Schedule appt   ED: [facility]   Reason: Constipation  Date: 5/11/25     Appt scheduled:         Comments:      Left voicemail for patient to call the office back to schedule a ED follow up appointment.     No future appointments.    LETTER MAILED

## 2025-07-25 DIAGNOSIS — Z30.011 ENCOUNTER FOR INITIAL PRESCRIPTION OF CONTRACEPTIVE PILLS: ICD-10-CM

## 2025-07-25 RX ORDER — NORGESTIMATE AND ETHINYL ESTRADIOL 7DAYSX3 28
1 KIT ORAL DAILY
Qty: 84 TABLET | Refills: 1 | Status: SHIPPED | OUTPATIENT
Start: 2025-07-25

## 2025-07-25 NOTE — TELEPHONE ENCOUNTER
Refill Request     CONFIRM preferred pharmacy with the patient.    If Mail Order Rx - Pend for 90 day refill.      Last Seen: Last Seen Department: 3/10/2025  Last Seen by PCP: 3/10/2025    Last Written: 8/19/2024    If no future appointment scheduled:  Review the last OV with PCP and review information for follow-up visit,  Route STAFF MESSAGE with patient name to the  Pool for scheduling with the following information:            -  Timing of next visit           -  Visit type ie Physical, OV, etc           -  Diagnoses/Reason ie. COPD, HTN - Do not use MEDICATION, Follow-up or CHECK UP - Give reason for visit      Next Appointment:   No future appointments.    Message sent to  to schedule appt with patient?  NO      Requested Prescriptions     Pending Prescriptions Disp Refills    TRI-SPRINTEC 0.18/0.215/0.25 MG-35 MCG TABS [Pharmacy Med Name: TRI-SPRINTEC TABLET] 84 tablet 3     Sig: TAKE 1 TABLET BY MOUTH DAILY